# Patient Record
Sex: MALE | Race: OTHER | URBAN - METROPOLITAN AREA
[De-identification: names, ages, dates, MRNs, and addresses within clinical notes are randomized per-mention and may not be internally consistent; named-entity substitution may affect disease eponyms.]

---

## 2024-11-10 ENCOUNTER — EMERGENCY (EMERGENCY)
Facility: HOSPITAL | Age: 44
LOS: 0 days | Discharge: HOME | End: 2024-11-10
Attending: STUDENT IN AN ORGANIZED HEALTH CARE EDUCATION/TRAINING PROGRAM
Payer: MEDICAID

## 2024-11-10 VITALS
RESPIRATION RATE: 18 BRPM | DIASTOLIC BLOOD PRESSURE: 85 MMHG | OXYGEN SATURATION: 100 % | TEMPERATURE: 97 F | SYSTOLIC BLOOD PRESSURE: 119 MMHG | HEART RATE: 81 BPM | WEIGHT: 149.91 LBS | HEIGHT: 66 IN

## 2024-11-10 VITALS
TEMPERATURE: 98 F | SYSTOLIC BLOOD PRESSURE: 112 MMHG | OXYGEN SATURATION: 100 % | HEART RATE: 66 BPM | RESPIRATION RATE: 18 BRPM | DIASTOLIC BLOOD PRESSURE: 75 MMHG

## 2024-11-10 DIAGNOSIS — R10.815 PERIUMBILIC ABDOMINAL TENDERNESS: ICD-10-CM

## 2024-11-10 DIAGNOSIS — R11.2 NAUSEA WITH VOMITING, UNSPECIFIED: ICD-10-CM

## 2024-11-10 DIAGNOSIS — K56.609 UNSPECIFIED INTESTINAL OBSTRUCTION, UNSPECIFIED AS TO PARTIAL VERSUS COMPLETE OBSTRUCTION: Chronic | ICD-10-CM

## 2024-11-10 DIAGNOSIS — F17.200 NICOTINE DEPENDENCE, UNSPECIFIED, UNCOMPLICATED: ICD-10-CM

## 2024-11-10 DIAGNOSIS — R10.9 UNSPECIFIED ABDOMINAL PAIN: ICD-10-CM

## 2024-11-10 LAB
ALBUMIN SERPL ELPH-MCNC: 3.8 G/DL — SIGNIFICANT CHANGE UP (ref 3.3–5)
ALP SERPL-CCNC: 80 U/L — SIGNIFICANT CHANGE UP (ref 40–120)
ALT FLD-CCNC: 24 U/L — SIGNIFICANT CHANGE UP (ref 12–78)
ANION GAP SERPL CALC-SCNC: 4 MMOL/L — LOW (ref 5–17)
APPEARANCE UR: CLEAR — SIGNIFICANT CHANGE UP
AST SERPL-CCNC: 16 U/L — SIGNIFICANT CHANGE UP (ref 15–37)
BACTERIA # UR AUTO: ABNORMAL /HPF
BASOPHILS # BLD AUTO: 0.04 K/UL — SIGNIFICANT CHANGE UP (ref 0–0.2)
BASOPHILS NFR BLD AUTO: 0.3 % — SIGNIFICANT CHANGE UP (ref 0–2)
BILIRUB SERPL-MCNC: 0.9 MG/DL — SIGNIFICANT CHANGE UP (ref 0.2–1.2)
BILIRUB UR-MCNC: NEGATIVE — SIGNIFICANT CHANGE UP
BUN SERPL-MCNC: 13 MG/DL — SIGNIFICANT CHANGE UP (ref 7–23)
CALCIUM SERPL-MCNC: 9.7 MG/DL — SIGNIFICANT CHANGE UP (ref 8.5–10.1)
CHLORIDE SERPL-SCNC: 107 MMOL/L — SIGNIFICANT CHANGE UP (ref 96–108)
CO2 SERPL-SCNC: 28 MMOL/L — SIGNIFICANT CHANGE UP (ref 22–31)
COLOR SPEC: SIGNIFICANT CHANGE UP
COMMENT - URINE: SIGNIFICANT CHANGE UP
CREAT SERPL-MCNC: 1.05 MG/DL — SIGNIFICANT CHANGE UP (ref 0.5–1.3)
DIFF PNL FLD: NEGATIVE — SIGNIFICANT CHANGE UP
EGFR: 91 ML/MIN/1.73M2 — SIGNIFICANT CHANGE UP
EOSINOPHIL # BLD AUTO: 0.07 K/UL — SIGNIFICANT CHANGE UP (ref 0–0.5)
EOSINOPHIL NFR BLD AUTO: 0.5 % — SIGNIFICANT CHANGE UP (ref 0–6)
GLUCOSE SERPL-MCNC: 108 MG/DL — HIGH (ref 70–99)
GLUCOSE UR QL: NEGATIVE MG/DL — SIGNIFICANT CHANGE UP
HCT VFR BLD CALC: 47.9 % — SIGNIFICANT CHANGE UP (ref 39–50)
HGB BLD-MCNC: 16.6 G/DL — SIGNIFICANT CHANGE UP (ref 13–17)
IMM GRANULOCYTES NFR BLD AUTO: 0.3 % — SIGNIFICANT CHANGE UP (ref 0–0.9)
KETONES UR-MCNC: 15 MG/DL
LEUKOCYTE ESTERASE UR-ACNC: ABNORMAL
LIDOCAIN IGE QN: 19 U/L — SIGNIFICANT CHANGE UP (ref 13–75)
LYMPHOCYTES # BLD AUTO: 1.46 K/UL — SIGNIFICANT CHANGE UP (ref 1–3.3)
LYMPHOCYTES # BLD AUTO: 11.3 % — LOW (ref 13–44)
MCHC RBC-ENTMCNC: 29 PG — SIGNIFICANT CHANGE UP (ref 27–34)
MCHC RBC-ENTMCNC: 34.7 G/DL — SIGNIFICANT CHANGE UP (ref 32–36)
MCV RBC AUTO: 83.6 FL — SIGNIFICANT CHANGE UP (ref 80–100)
MONOCYTES # BLD AUTO: 0.95 K/UL — HIGH (ref 0–0.9)
MONOCYTES NFR BLD AUTO: 7.3 % — SIGNIFICANT CHANGE UP (ref 2–14)
NEUTROPHILS # BLD AUTO: 10.4 K/UL — HIGH (ref 1.8–7.4)
NEUTROPHILS NFR BLD AUTO: 80.3 % — HIGH (ref 43–77)
NITRITE UR-MCNC: NEGATIVE — SIGNIFICANT CHANGE UP
NRBC # BLD: 0 /100 WBCS — SIGNIFICANT CHANGE UP (ref 0–0)
PH UR: 7 — SIGNIFICANT CHANGE UP (ref 5–8)
PLATELET # BLD AUTO: 191 K/UL — SIGNIFICANT CHANGE UP (ref 150–400)
POTASSIUM SERPL-MCNC: 4 MMOL/L — SIGNIFICANT CHANGE UP (ref 3.5–5.3)
POTASSIUM SERPL-SCNC: 4 MMOL/L — SIGNIFICANT CHANGE UP (ref 3.5–5.3)
PROT SERPL-MCNC: 7.5 GM/DL — SIGNIFICANT CHANGE UP (ref 6–8.3)
PROT UR-MCNC: 30 MG/DL
RBC # BLD: 5.73 M/UL — SIGNIFICANT CHANGE UP (ref 4.2–5.8)
RBC # FLD: 13.9 % — SIGNIFICANT CHANGE UP (ref 10.3–14.5)
RBC CASTS # UR COMP ASSIST: 0 /HPF — SIGNIFICANT CHANGE UP (ref 0–4)
SODIUM SERPL-SCNC: 139 MMOL/L — SIGNIFICANT CHANGE UP (ref 135–145)
SP GR SPEC: 1.03 — SIGNIFICANT CHANGE UP (ref 1–1.03)
UROBILINOGEN FLD QL: 1 MG/DL — SIGNIFICANT CHANGE UP (ref 0.2–1)
WBC # BLD: 12.96 K/UL — HIGH (ref 3.8–10.5)
WBC # FLD AUTO: 12.96 K/UL — HIGH (ref 3.8–10.5)
WBC UR QL: 4 /HPF — SIGNIFICANT CHANGE UP (ref 0–5)

## 2024-11-10 PROCEDURE — 99285 EMERGENCY DEPT VISIT HI MDM: CPT

## 2024-11-10 PROCEDURE — 74177 CT ABD & PELVIS W/CONTRAST: CPT | Mod: 26,MC

## 2024-11-10 PROCEDURE — 99284 EMERGENCY DEPT VISIT MOD MDM: CPT

## 2024-11-10 RX ORDER — SODIUM CHLORIDE 9 MG/ML
1000 INJECTION, SOLUTION INTRAMUSCULAR; INTRAVENOUS; SUBCUTANEOUS ONCE
Refills: 0 | Status: COMPLETED | OUTPATIENT
Start: 2024-11-10 | End: 2024-11-10

## 2024-11-10 RX ORDER — MORPHINE SULFATE 30 MG/1
4 TABLET, EXTENDED RELEASE ORAL ONCE
Refills: 0 | Status: DISCONTINUED | OUTPATIENT
Start: 2024-11-10 | End: 2024-11-10

## 2024-11-10 RX ADMIN — MORPHINE SULFATE 4 MILLIGRAM(S): 30 TABLET, EXTENDED RELEASE ORAL at 06:18

## 2024-11-10 RX ADMIN — MORPHINE SULFATE 4 MILLIGRAM(S): 30 TABLET, EXTENDED RELEASE ORAL at 05:38

## 2024-11-10 RX ADMIN — SODIUM CHLORIDE 1000 MILLILITER(S): 9 INJECTION, SOLUTION INTRAMUSCULAR; INTRAVENOUS; SUBCUTANEOUS at 05:35

## 2024-11-10 NOTE — ED PROVIDER NOTE - OBJECTIVE STATEMENT
43 y/o M hx of SBO w/ resection presents w/ abdominal pain. midline per patient. endorsing nausea/vomiting 2 episodes non bloody. began for past 1 day. no bowel movement today. denies fever/chills. denies trauma/falls.

## 2024-11-10 NOTE — ED PROVIDER NOTE - CLINICAL SUMMARY MEDICAL DECISION MAKING FREE TEXT BOX
45 y/o M hx of SBO w/ resection presents w/ abdominal pain. midline per patient. endorsing nausea/vomiting 2 episodes non bloody. began for past 1 day. no bowel movement today. denies fever/chills. denies trauma/falls.   tender midline periumbilical, no rebound/no guarding  labs   ct scan  consider obstruction, colitis, appendicitis  pain meds 43 y/o M hx of SBO w/ resection presents w/ abdominal pain. midline per patient. endorsing nausea/vomiting 2 episodes non bloody. began for past 1 day. no bowel movement today. denies fever/chills. denies trauma/falls.   tender midline periumbilical, no rebound/no guarding  labs   ct scan  consider obstruction, colitis, appendicitis  pain meds    Layne DO: pt received at sign out pending CT- CTr with possible partial sbo vs early sbo, pt reports abd pain resolved, abd soft ntnd, no vomiting, pt reports passing flatus. surgery consulted, cleared pt for dc, pt also wishes for dc, states feels better, strict return precautions and outpt follow up advised . Stable

## 2024-11-10 NOTE — ED PROVIDER NOTE - CARE PROVIDER_API CALL
Rosa M Adams  Gastroenterology  [address] [place] [address]  Phone: [phone]  Fax: [phone]  Follow Up Time:

## 2024-11-10 NOTE — ED ADULT NURSE NOTE - NS ED NURSE DC PT EDUCATION RESOURCES
Clinic Care Coordination Contact 4/17/19  OUTREACH    Referral Information:  Referral Source: PCP    Primary Diagnosis: Psychosocial    Chief Complaint   Patient presents with     Clinic Care Coordination - Initial     SW        Universal Utilization: NA  Clinic Utilization  Difficulty keeping appointments:: No  Compliance Concerns: No  No-Show Concerns: No  No PCP office visit in Past Year: Yes  Utilization    Last refreshed: 4/17/2019  5:00 AM:  Hospital Admissions 0           Last refreshed: 4/17/2019  5:00 AM:  ED Visits 0           Last refreshed: 4/17/2019  5:00 AM:  No Show Count (past year) 0              Current as of: 4/17/2019  5:00 AM              Clinical Concerns:  Current Medical Concerns:    Patient Active Problem List   Diagnosis     Chronic low back pain     CARDIOVASCULAR SCREENING; LDL GOAL LESS THAN 130     Urinary retention     Neurogenic bladder     Moderate major depression (H)     Mild intermittent asthma without complication     Erectile dysfunction, unspecified erectile dysfunction type     Benign essential hypertension     Obesity due to excess calories, unspecified obesity severity         Current Behavioral Concerns: See above    Education Provided to patient: Yes      Health Maintenance Reviewed: Not assessed  Clinical Pathway: None    Medication Management:  NA     Functional Status:  Dependent ADLs:: Independent  Dependent IADLs:: Independent  Bed or wheelchair confined:: Yes  Mobility Status: Independent w/Device    Living Situation:  Current living arrangement:: I live in a private home with family  Type of residence:: Private home - stairs. Pt and his wife own their own home.     Diet/Exercise/Sleep:       Transportation:  Transportation concerns (GOAL):: Yes  Transportation means:: Accessible car  Pt does not have access to transportation and states he has not been out of his home from over a year. Pt needs to be seen in the clinic in order to obtain med refills, etc.     SW  talked to him about [employer], [facility] and [employer] as options. Pt will start with [facility] as [employer] [facility] does not travel where the bus lines are. [employer] application was sent to the pt today for him to complete.      Psychosocial:  Moravian or spiritual beliefs that impact treatment:: No  Mental health DX:: No  Mental health management concern (GOAL):: No  Informal Support system:: Children, Spouse     Financial/Insurance:   Financial/Insurance concerns (GOAL):: Yes(SSDI 1800, wife is working as well)  Pt and his wife appear to be over income for MA so we discussed spenddowns and how they work. We will proceed with a [place] [facility] referral so the [facility] can look over his finances and determine how much he might have to pay.      Resources and Interventions:  Current Resources:   None per pt. Pt states sometimes he falls out of his WC and has to call his son to help him get back in it. He self transfers himself from his bed to the chair and states it is becoming more difficult for him to do this.   Community Resources: None     Equipment Currently Used at Home: wheelchair, manual    Advance Care Plan/Directive  Advanced Care Plans/Directives on file:: No  Advanced Care Plan/Directive Status: Considering Options    Referrals Placed: [facility]     Goals:   Goals        General    Financial Wellbeing (pt-stated)     Notes - Note created  4/17/2019  4:14 PM by Tanisha Romano LSW    Goal Statement: I would like to learn about the financial resources available to me  Measure of Success: Whether or not I am able to qualify  Supportive Steps to Achieve: [facility] to send me resources and I will apply for the ones that I am interested in.   Barriers: None noted.   Strengths: Pt is willing and able to apply for assistance.   Date to Achieve By: July 2019  Patient expressed understanding of goal: yes                  Patient/Caregiver understanding: yes    Outreach Frequency: 2 weeks      Plan:   1.) SW  to send the pt applications for LT services and [employer]  2.) Pt to call [facility] to see if they can assist him with getting to the clinic.   3.) SW to make a [place] referral for him to [facility].    Tanisha Romano JENNIFER  [facility] -Care Coordination  Esdras Cornejo  [phone]  4/17/2019 4:19 PM                         Yes

## 2024-11-10 NOTE — ED ADULT NURSE NOTE - NSFALLUNIVINTERV_ED_ALL_ED
Bed/Stretcher in lowest position, wheels locked, appropriate side rails in place/Call bell, personal items and telephone in reach/Instruct patient to call for assistance before getting out of bed/chair/stretcher/Non-slip footwear applied when patient is off stretcher/[place] to call system/Physically safe environment - no spills, clutter or unnecessary equipment/Purposeful proactive rounding/Room/bathroom lighting operational, light cord in reach

## 2024-11-10 NOTE — ED ADULT NURSE NOTE - PAIN: PRESENCE, MLM
..4 Eyes Skin Assessment     NAME:  Maico Paez  YOB: 1993  MEDICAL RECORD NUMBER:  [id number]    The patient is being assessed for  Admission    I agree that at least one RN has performed a thorough Head to Toe Skin Assessment on the patient. ALL assessment sites listed below have been assessed.      Areas assessed by both nurses:    Head, Face, Ears, Shoulders, Back, Chest, Arms, Elbows, Hands, Sacrum. Buttock, Coccyx, Ischium, and Legs. Feet and Heels        Does the Patient have a Wound? No noted wound(s) Patient has a dry and flaky skin       Jaspreet Prevention initiated by RN: Yes  Wound Care Orders initiated by RN: Yes    Pressure Injury (Stage 3,4, Unstageable, DTI, NWPT, and Complex wounds) if present, place Wound referral order by RN under : No    New Ostomies, if present place, Ostomy referral order under : No     Nurse 1 eSignature: Electronically signed by Marck Gonzalez RN on 3/16/24 at 3:04 PM EDT    **SHARE this note so that the co-signing nurse can place an eSignature**    Nurse 2 eSignature: Electronically signed by Sheri Hartman RN on 3/16/24 at 3:04 PM EDT    complains of pain/discomfort

## 2024-11-10 NOTE — ED PROVIDER NOTE - PHYSICAL EXAMINATION
General: Well appearing male in no acute distress  HEENT: Normocephalic, atraumatic. Moist mucous membranes. Oropharynx clear. No lymphadenopathy.  Eyes: No scleral icterus. EOMI. DOROTHY.  Neck:. Soft and supple. Full ROM without pain. No midline tenderness  Cardiac: Regular rate and regular rhythm. No murmurs, rubs, gallops. Peripheral pulses 2+ and symmetric. No LE edema.  Resp: Lungs CTAB. Speaking in full sentences. No wheezes, rales or rhonchi.  Abd: Soft, +tender midline periumbilical  non-distended. No guarding or rebound.   Back: Spine midline and non-tender. No CVA tenderness.    Skin: No rashes, abrasions, or lacerations.  Neuro: AO x 3. Moves all extremities symmetrically. Motor strength and sensation grossly intact.

## 2024-11-10 NOTE — ED PROVIDER NOTE - NSFOLLOWUPINSTRUCTIONS_ED_ALL_ED_FT
You were seen today for abdominal pain with suspected partial bowel obstruction with improvement while in the emergency room     Stay hydrated. Liquid diet for next few days. If you have repeat pain, vomiting, fever or inability to pass gas or have bowel movement, return to the emergency room for evaluation    Follow up with gastroenterologist.       Acute Abdominal Pain    WHAT YOU NEED TO KNOW:    The cause of your abdominal pain may not be found. If a cause is found, treatment will depend on what the cause is.     DISCHARGE INSTRUCTIONS:    Return to the emergency department if:     You vomit blood or cannot stop vomiting.      You have blood in your bowel movement or it looks like tar.       You have bleeding from your rectum.       Your abdomen is larger than usual, more painful, and hard.       You have severe pain in your abdomen.       You stop passing gas and having bowel movements.       You feel weak, dizzy, or faint.    Contact your healthcare provider if:     You have a fever.      You have new signs and symptoms.      Your symptoms do not get better with treatment.       You have questions or concerns about your condition or care.    Medicines may be given to decrease pain, treat an infection, and manage your symptoms. Take your medicine as directed. Call your healthcare provider if you think your medicine is not helping or if you have side effects. Tell him if you are allergic to any medicine. Keep a list of the medicines, vitamins, and herbs you take. Include the amounts, and when and why you take them. Bring the list or the pill bottles to follow-up visits. Carry your medicine list with you in case of an emergency.    Manage your symptoms:     Apply heat on your abdomen for 20 to 30 minutes every 2 hours for as many days as directed. Heat helps decrease pain and muscle spasms.       Manage your stress. Stress may cause abdominal pain. Your healthcare provider may recommend relaxation techniques and deep breathing exercises to help decrease your stress. Your healthcare provider may recommend you talk to someone about your stress or anxiety, such as a counselor or a trusted friend. Get plenty of sleep and exercise regularly.       Limit or do not drink alcohol. Alcohol can make your abdominal pain worse. Ask your healthcare provider if it is safe for you to drink alcohol. Also ask how much is safe for you to drink.       Do not smoke. Nicotine and other chemicals in cigarettes can damage your esophagus and stomach. Ask your healthcare provider for information if you currently smoke and need help to quit. E-cigarettes or smokeless tobacco still contain nicotine. Talk to your healthcare provider before you use these products.     Make changes to the food you eat as directed: Do not eat foods that cause abdominal pain or other symptoms. Eat small meals more often.     Eat more high-fiber foods if you are constipated. High-fiber foods include fruits, vegetables, whole-grain foods, and legumes.       Do not eat foods that cause gas if you have bloating. Examples include broccoli, cabbage, and cauliflower. Do not drink soda or carbonated drinks, because these may also cause gas.       Do not eat foods or drinks that contain sorbitol or fructose if you have diarrhea and bloating. Some examples are fruit juices, candy, jelly, and sugar-free gum.       Do not eat high-fat foods, such as fried foods, cheeseburgers, hot dogs, and desserts.      Limit or do not drink caffeine. Caffeine may make symptoms, such as heart burn or nausea, worse.       Drink plenty of liquids to prevent dehydration from diarrhea or vomiting. Ask your healthcare provider how much liquid to drink each day and which liquids are best for you.     Follow up with your healthcare provider as directed: Write down your questions so you remember to ask them during your visits.

## 2024-11-10 NOTE — CONSULT NOTE ADULT - SUBJECTIVE AND OBJECTIVE BOX
Patient is a 44y old  Male who presents with a chief complaint of     HPI: 45 yo M current smoker, pmhx of SBO s/p SBR in 2015 at [facility] presented to er with abd pain started 6pm yesterday. pt's last bm was friday,. felt urge to have a bm sat but couldnt. had nausea with 2 episodes of vomiting so came to ed. pt endorses + flatus. pain has resolved in the ed and tolerated a diet. denies fevers, chills. one other prior SBO tx conservatively at [facility] several years ago.       PAST MEDICAL & SURGICAL HISTORY:  SBO (small bowel obstruction) s/p SBR in 2015 @ [facility]          Review of Systems:  Negative except  as above in HPI    MEDICATIONS  (STANDING):    MEDICATIONS  (PRN):      Allergies    No Known Allergies    Intolerances        SOCIAL HISTORY current everyday cig smoker, no etoh use                Vital Signs Last 24 Hrs  T(C): 36.8 (10 Nov 2024 11:25), Max: 36.8 (10 Nov 2024 11:25)  T(F): 98.2 (10 Nov 2024 11:25), Max: 98.2 (10 Nov 2024 11:25)  HR: 66 (10 Nov 2024 11:25) (66 - 81)  BP: 112/75 (10 Nov 2024 11:25) (107/60 - 119/85)  BP(mean): --  RR: 18 (10 Nov 2024 11:25) (18 - 18)  SpO2: 100% (10 Nov 2024 11:25) (97% - 100%)    Parameters below as of 10 Nov 2024 11:25  Patient On (Oxygen Delivery Method): room air        Physical Exam:  General:  Appears stated age, well-groomed, well-nourished, no distress  Eyes: EOMI, conjunctiva clear  HENT:  WNL, no JVD  Chest: no respiratory distress, no accessory muscle use  Abdomen:  soft, nondistended, nontender.   Extremities:  no pedal edema or calf tenderness noted  Skin:  No rash  Musculoskeletal:  normal strength  Neuro/Psych:  Alert, oriented to time, place and person     LABS:                        16.6   12.96 )-----------( 191      ( 10 Nov 2024 05:38 )             47.9     11-10    139  |  107  |  13  ----------------------------<  108[H]  4.0   |  28  |  1.05    Ca    9.7      10 Nov 2024 05:38    TPro  7.5  /  Alb  3.8  /  TBili  0.9  /  DBili  x   /  AST  16  /  ALT  24  /  AlkPhos  80  11-10      Urinalysis Basic - ( 10 Nov 2024 06:22 )    Color: Dark Yellow / Appearance: Clear / S.028 / pH: x  Gluc: x / Ketone: 15 mg/dL  / Bili: Negative / Urobili: 1.0 mg/dL   Blood: x / Protein: 30 mg/dL / Nitrite: Negative   Leuk Esterase: Trace / RBC: 0 /HPF / WBC 4 /HPF   Sq Epi: x / Non Sq Epi: x / Bacteria: Occasional /HPF          RADIOLOGY & ADDITIONAL STUDIES:  < from: CT Abdomen and Pelvis w/ IV Cont (11.10.24 @ 07:46) >  IMPRESSION:  Mildly dilated fecalized small bowel proximal to the right abdominal   small bowel anastomosis suggesting partial or early obstruction.  Trace fluid in the dependent pelvis.  Additional findings as above.        --- End of Report ---    < end of copied text >          A/P:  45 yo M current smoker, pmhx of SBO s/p SBR in  at [facility] presented to er with abd pain started 6pm yesterday associated with n/v.   CT shows partial vs early SBO. pt with + GI fx. sxs now resolved tolerating PO in ed  -no acute surgical intervention  -advised to return to ed if sxs return  -case discussed with surgeon Dr Graves and ED attending Dr Layne

## 2024-11-11 LAB
CULTURE RESULTS: SIGNIFICANT CHANGE UP
SPECIMEN SOURCE: SIGNIFICANT CHANGE UP